# Patient Record
Sex: MALE | Race: BLACK OR AFRICAN AMERICAN | NOT HISPANIC OR LATINO | Employment: OTHER | URBAN - METROPOLITAN AREA
[De-identification: names, ages, dates, MRNs, and addresses within clinical notes are randomized per-mention and may not be internally consistent; named-entity substitution may affect disease eponyms.]

---

## 2017-10-18 ENCOUNTER — HOSPITAL ENCOUNTER (OUTPATIENT)
Facility: HOSPITAL | Age: 52
Discharge: HOME | End: 2017-10-19
Attending: EMERGENCY MEDICINE | Admitting: INTERNAL MEDICINE
Payer: MEDICARE

## 2017-10-18 DIAGNOSIS — E10.65 HYPERGLYCEMIA DUE TO TYPE 1 DIABETES MELLITUS: Primary | ICD-10-CM

## 2017-10-18 DIAGNOSIS — E86.0 DEHYDRATION: ICD-10-CM

## 2017-10-18 PROBLEM — N18.30 CHRONIC RENAL FAILURE, STAGE 3 (MODERATE): Status: ACTIVE | Noted: 2017-10-18

## 2017-10-18 PROBLEM — N18.9 CHRONIC KIDNEY FAILURE: Status: ACTIVE | Noted: 2017-10-18

## 2017-10-18 LAB
ALBUMIN SERPL BCP-MCNC: 3.5 G/DL
ALLENS TEST: ABNORMAL
ALP SERPL-CCNC: 98 U/L
ALT SERPL W/O P-5'-P-CCNC: 11 U/L
ANION GAP SERPL CALC-SCNC: 13 MMOL/L
AST SERPL-CCNC: 14 U/L
BACTERIA #/AREA URNS HPF: NORMAL /HPF
BASOPHILS # BLD AUTO: 0.01 K/UL
BASOPHILS NFR BLD: 0.1 %
BILIRUB SERPL-MCNC: 0.8 MG/DL
BILIRUB UR QL STRIP: NEGATIVE
BUN SERPL-MCNC: 30 MG/DL
CALCIUM SERPL-MCNC: 9.5 MG/DL
CHLORIDE SERPL-SCNC: 97 MMOL/L
CLARITY UR: CLEAR
CO2 SERPL-SCNC: 22 MMOL/L
COLOR UR: YELLOW
CREAT SERPL-MCNC: 1.9 MG/DL
DELSYS: ABNORMAL
DIFFERENTIAL METHOD: ABNORMAL
EOSINOPHIL # BLD AUTO: 0 K/UL
EOSINOPHIL NFR BLD: 0.4 %
ERYTHROCYTE [DISTWIDTH] IN BLOOD BY AUTOMATED COUNT: 12.4 %
EST. GFR  (AFRICAN AMERICAN): 46 ML/MIN/1.73 M^2
EST. GFR  (NON AFRICAN AMERICAN): 40 ML/MIN/1.73 M^2
FIO2: 21
GLUCOSE SERPL-MCNC: 653 MG/DL
GLUCOSE UR QL STRIP: ABNORMAL
HCO3 UR-SCNC: 21 MMOL/L (ref 24–28)
HCT VFR BLD AUTO: 39.5 %
HGB BLD-MCNC: 13.2 G/DL
HGB UR QL STRIP: NEGATIVE
KETONES UR QL STRIP: ABNORMAL
LEUKOCYTE ESTERASE UR QL STRIP: NEGATIVE
LYMPHOCYTES # BLD AUTO: 1.2 K/UL
LYMPHOCYTES NFR BLD: 13.8 %
MCH RBC QN AUTO: 27.1 PG
MCHC RBC AUTO-ENTMCNC: 33.4 G/DL
MCV RBC AUTO: 81 FL
MICROSCOPIC COMMENT: NORMAL
MODE: ABNORMAL
MONOCYTES # BLD AUTO: 0.4 K/UL
MONOCYTES NFR BLD: 4.4 %
NEUTROPHILS # BLD AUTO: 7.3 K/UL
NEUTROPHILS NFR BLD: 81.3 %
NITRITE UR QL STRIP: NEGATIVE
PCO2 BLDA: 38.8 MMHG (ref 35–45)
PH SMN: 7.34 [PH] (ref 7.35–7.45)
PH UR STRIP: 5 [PH] (ref 5–8)
PLATELET # BLD AUTO: 219 K/UL
PMV BLD AUTO: 10.8 FL
PO2 BLDA: 94 MMHG (ref 80–100)
POC BE: -5 MMOL/L
POC SATURATED O2: 97 % (ref 95–100)
POCT GLUCOSE: 265 MG/DL (ref 70–110)
POCT GLUCOSE: 411 MG/DL (ref 70–110)
POCT GLUCOSE: 471 MG/DL (ref 70–110)
POCT GLUCOSE: 477 MG/DL (ref 70–110)
POTASSIUM SERPL-SCNC: 4.6 MMOL/L
PROT SERPL-MCNC: 7.4 G/DL
PROT UR QL STRIP: NEGATIVE
RBC # BLD AUTO: 4.87 M/UL
RBC #/AREA URNS HPF: 2 /HPF (ref 0–4)
SAMPLE: ABNORMAL
SITE: ABNORMAL
SODIUM SERPL-SCNC: 132 MMOL/L
SP GR UR STRIP: 1.01 (ref 1–1.03)
URN SPEC COLLECT METH UR: ABNORMAL
UROBILINOGEN UR STRIP-ACNC: NEGATIVE EU/DL
WBC # BLD AUTO: 9.01 K/UL
WBC #/AREA URNS HPF: 1 /HPF (ref 0–5)
YEAST URNS QL MICRO: NORMAL

## 2017-10-18 PROCEDURE — 96361 HYDRATE IV INFUSION ADD-ON: CPT

## 2017-10-18 PROCEDURE — 36600 WITHDRAWAL OF ARTERIAL BLOOD: CPT

## 2017-10-18 PROCEDURE — 63600175 PHARM REV CODE 636 W HCPCS: Performed by: NURSE PRACTITIONER

## 2017-10-18 PROCEDURE — 96374 THER/PROPH/DIAG INJ IV PUSH: CPT

## 2017-10-18 PROCEDURE — 99900035 HC TECH TIME PER 15 MIN (STAT)

## 2017-10-18 PROCEDURE — G0378 HOSPITAL OBSERVATION PER HR: HCPCS

## 2017-10-18 PROCEDURE — 25000003 PHARM REV CODE 250: Performed by: NURSE PRACTITIONER

## 2017-10-18 PROCEDURE — 81000 URINALYSIS NONAUTO W/SCOPE: CPT

## 2017-10-18 PROCEDURE — 80053 COMPREHEN METABOLIC PANEL: CPT

## 2017-10-18 PROCEDURE — 99284 EMERGENCY DEPT VISIT MOD MDM: CPT | Mod: 25

## 2017-10-18 PROCEDURE — 82800 BLOOD PH: CPT

## 2017-10-18 PROCEDURE — 25000003 PHARM REV CODE 250: Performed by: EMERGENCY MEDICINE

## 2017-10-18 PROCEDURE — 36415 COLL VENOUS BLD VENIPUNCTURE: CPT

## 2017-10-18 PROCEDURE — 96372 THER/PROPH/DIAG INJ SC/IM: CPT

## 2017-10-18 PROCEDURE — 82962 GLUCOSE BLOOD TEST: CPT

## 2017-10-18 PROCEDURE — 63600175 PHARM REV CODE 636 W HCPCS: Performed by: EMERGENCY MEDICINE

## 2017-10-18 PROCEDURE — 85025 COMPLETE CBC W/AUTO DIFF WBC: CPT

## 2017-10-18 PROCEDURE — 83036 HEMOGLOBIN GLYCOSYLATED A1C: CPT

## 2017-10-18 RX ORDER — ATORVASTATIN CALCIUM 40 MG/1
80 TABLET, FILM COATED ORAL DAILY
Status: DISCONTINUED | OUTPATIENT
Start: 2017-10-19 | End: 2017-10-19 | Stop reason: HOSPADM

## 2017-10-18 RX ORDER — GLUCAGON 1 MG
1 KIT INJECTION
Status: DISCONTINUED | OUTPATIENT
Start: 2017-10-18 | End: 2017-10-19 | Stop reason: HOSPADM

## 2017-10-18 RX ORDER — CARVEDILOL 6.25 MG/1
6.25 TABLET ORAL 2 TIMES DAILY WITH MEALS
Status: DISCONTINUED | OUTPATIENT
Start: 2017-10-18 | End: 2017-10-19 | Stop reason: HOSPADM

## 2017-10-18 RX ORDER — ENOXAPARIN SODIUM 100 MG/ML
40 INJECTION SUBCUTANEOUS EVERY 24 HOURS
Status: DISCONTINUED | OUTPATIENT
Start: 2017-10-18 | End: 2017-10-19 | Stop reason: HOSPADM

## 2017-10-18 RX ORDER — INSULIN ASPART 100 [IU]/ML
1-10 INJECTION, SOLUTION INTRAVENOUS; SUBCUTANEOUS
Status: DISCONTINUED | OUTPATIENT
Start: 2017-10-18 | End: 2017-10-19 | Stop reason: HOSPADM

## 2017-10-18 RX ORDER — HYDRALAZINE HYDROCHLORIDE 50 MG/1
100 TABLET, FILM COATED ORAL 3 TIMES DAILY
Status: DISCONTINUED | OUTPATIENT
Start: 2017-10-18 | End: 2017-10-19 | Stop reason: HOSPADM

## 2017-10-18 RX ORDER — PANTOPRAZOLE SODIUM 40 MG/1
40 TABLET, DELAYED RELEASE ORAL DAILY
Status: DISCONTINUED | OUTPATIENT
Start: 2017-10-19 | End: 2017-10-19 | Stop reason: HOSPADM

## 2017-10-18 RX ORDER — SODIUM CHLORIDE 9 MG/ML
1000 INJECTION, SOLUTION INTRAVENOUS
Status: COMPLETED | OUTPATIENT
Start: 2017-10-18 | End: 2017-10-18

## 2017-10-18 RX ORDER — NAPROXEN SODIUM 220 MG/1
81 TABLET, FILM COATED ORAL DAILY
Status: DISCONTINUED | OUTPATIENT
Start: 2017-10-19 | End: 2017-10-19 | Stop reason: HOSPADM

## 2017-10-18 RX ORDER — HYDROCHLOROTHIAZIDE 25 MG/1
50 TABLET ORAL DAILY
Status: DISCONTINUED | OUTPATIENT
Start: 2017-10-19 | End: 2017-10-19 | Stop reason: HOSPADM

## 2017-10-18 RX ORDER — LISINOPRIL 20 MG/1
40 TABLET ORAL DAILY
Status: DISCONTINUED | OUTPATIENT
Start: 2017-10-19 | End: 2017-10-19 | Stop reason: HOSPADM

## 2017-10-18 RX ORDER — AMLODIPINE BESYLATE 10 MG/1
10 TABLET ORAL NIGHTLY
Status: DISCONTINUED | OUTPATIENT
Start: 2017-10-18 | End: 2017-10-19 | Stop reason: HOSPADM

## 2017-10-18 RX ORDER — IBUPROFEN 200 MG
24 TABLET ORAL
Status: DISCONTINUED | OUTPATIENT
Start: 2017-10-18 | End: 2017-10-19 | Stop reason: HOSPADM

## 2017-10-18 RX ORDER — ENOXAPARIN SODIUM 100 MG/ML
40 INJECTION SUBCUTANEOUS EVERY 24 HOURS
Status: DISCONTINUED | OUTPATIENT
Start: 2017-10-18 | End: 2017-10-18 | Stop reason: SDUPTHER

## 2017-10-18 RX ORDER — SODIUM CHLORIDE 9 MG/ML
INJECTION, SOLUTION INTRAVENOUS CONTINUOUS
Status: DISCONTINUED | OUTPATIENT
Start: 2017-10-18 | End: 2017-10-18

## 2017-10-18 RX ORDER — CLONIDINE HYDROCHLORIDE 0.3 MG/1
0.3 TABLET ORAL 2 TIMES DAILY
Status: DISCONTINUED | OUTPATIENT
Start: 2017-10-18 | End: 2017-10-19 | Stop reason: HOSPADM

## 2017-10-18 RX ORDER — IBUPROFEN 200 MG
16 TABLET ORAL
Status: DISCONTINUED | OUTPATIENT
Start: 2017-10-18 | End: 2017-10-19 | Stop reason: HOSPADM

## 2017-10-18 RX ORDER — DOXAZOSIN 2 MG/1
4 TABLET ORAL NIGHTLY
Status: DISCONTINUED | OUTPATIENT
Start: 2017-10-18 | End: 2017-10-19 | Stop reason: HOSPADM

## 2017-10-18 RX ORDER — DOXAZOSIN 4 MG/1
4 TABLET ORAL NIGHTLY
COMMUNITY

## 2017-10-18 RX ORDER — SPIRONOLACTONE 25 MG/1
50 TABLET ORAL DAILY
Status: DISCONTINUED | OUTPATIENT
Start: 2017-10-19 | End: 2017-10-19 | Stop reason: HOSPADM

## 2017-10-18 RX ADMIN — CARVEDILOL 6.25 MG: 6.25 TABLET, FILM COATED ORAL at 07:10

## 2017-10-18 RX ADMIN — DOXAZOSIN MESYLATE 4 MG: 2 TABLET ORAL at 09:10

## 2017-10-18 RX ADMIN — INSULIN HUMAN 10 UNITS: 100 INJECTION, SOLUTION PARENTERAL at 03:10

## 2017-10-18 RX ADMIN — CLONIDINE HYDROCHLORIDE 0.3 MG: 0.3 TABLET ORAL at 09:10

## 2017-10-18 RX ADMIN — SODIUM CHLORIDE 1000 ML: 0.9 INJECTION, SOLUTION INTRAVENOUS at 03:10

## 2017-10-18 RX ADMIN — HYDRALAZINE HYDROCHLORIDE 100 MG: 50 TABLET, FILM COATED ORAL at 09:10

## 2017-10-18 RX ADMIN — INSULIN ASPART 3 UNITS: 100 INJECTION, SOLUTION INTRAVENOUS; SUBCUTANEOUS at 08:10

## 2017-10-18 RX ADMIN — INSULIN HUMAN 10 UNITS: 100 INJECTION, SOLUTION PARENTERAL at 04:10

## 2017-10-18 RX ADMIN — INSULIN DETEMIR 15 UNITS: 100 INJECTION, SOLUTION SUBCUTANEOUS at 04:10

## 2017-10-18 RX ADMIN — AMLODIPINE BESYLATE 10 MG: 10 TABLET ORAL at 09:10

## 2017-10-18 NOTE — ED NOTES
Pt lying in bed in NAD,VSS,RR equal and unlabored. Bed is low, locked, and call light in reach. Side rails up x 2. Pt and family updated on POC, will continue to monitor.

## 2017-10-18 NOTE — ASSESSMENT & PLAN NOTE
-Serum glucose 653, Anion gap 13, ph 7.342, HCO3 21.0  -UA shows 2+Ketones, 3+ glucose   - 10 units of Regular insulin IV and SC, and Detemir 15 units given in the ED  -2L fluid bolus given  -Maintenance fluids   -Accuchecks q2  -Last A1C 5.9 on 7/2016  -Patient reports noncompliance with diet   -A1c pending   -1800 corine ADA diet

## 2017-10-18 NOTE — PROGRESS NOTES
"Patient arrived to unit from ER via stretcher.   Patient in room 226.  Transferred into bed with 1 assist.   Bedside report given with Chari CAMERON.  Charge nurse advised of patient arrival.   VS currently stable.   Tele monitored applied.   Patient oriented to room, rounding sheet and call bell.   Bed in lowest position, call light in reach.  Encouraged to notify of all needs.   Will continue to monitor.  BP (!) 154/108 (BP Location: Right arm, Patient Position: Sitting)   Pulse 102   Temp 97.6 °F (36.4 °C) (Oral)   Resp 20   Ht 5' 8" (1.727 m)   Wt 90.8 kg (200 lb 2.8 oz)   SpO2 100%   BMI 30.44 kg/m²     "

## 2017-10-18 NOTE — SUBJECTIVE & OBJECTIVE
Past Medical History:   Diagnosis Date    Diabetes mellitus     Hypertension     Stroke        History reviewed. No pertinent surgical history.    Review of patient's allergies indicates:  No Known Allergies    No current facility-administered medications on file prior to encounter.      Current Outpatient Prescriptions on File Prior to Encounter   Medication Sig    aspirin 81 MG Chew Take 1 tablet (81 mg total) by mouth once daily.    carvedilol (COREG) 6.25 MG tablet Take 6.25 mg by mouth 2 (two) times daily with meals.    cloNIDine (CATAPRES) 0.3 MG tablet Take 0.3 mg by mouth 2 (two) times daily.    lisinopril (PRINIVIL,ZESTRIL) 40 MG tablet Take 40 mg by mouth once daily.    amlodipine (NORVASC) 10 MG tablet Take 10 mg by mouth every evening.     atorvastatin (LIPITOR) 80 MG tablet Take 1 tablet (80 mg total) by mouth once daily.    citalopram (CELEXA) 10 MG tablet Take 10 mg by mouth once daily.    hydrALAZINE (APRESOLINE) 100 MG tablet Take 100 mg by mouth 3 (three) times daily.    hydrochlorothiazide (HYDRODIURIL) 12.5 MG Tab Take 12.5 mg by mouth once daily.    insulin glargine (LANTUS) 100 unit/mL injection Inject 8 Units into the skin every evening.    metformin (GLUCOPHAGE) 1000 MG tablet Take 1 tablet (1,000 mg total) by mouth 2 (two) times daily before meals.    pantoprazole (PROTONIX) 40 MG tablet Take 40 mg by mouth once daily.    polyethylene glycol (GLYCOLAX) 17 gram PwPk Take 17 g by mouth once daily.    sertraline (ZOLOFT) 50 MG tablet Take 50 mg by mouth once daily.    sitagliptin (JANUVIA) 100 MG Tab Take 100 mg by mouth once daily.     Family History     Problem Relation (Age of Onset)    Heart disease Mother        Social History Main Topics    Smoking status: Former Smoker    Smokeless tobacco: Never Used    Alcohol use No    Drug use: No    Sexual activity: Not on file     Review of Systems   Constitutional: Negative.  Negative for activity change, appetite change,  chills, fatigue and fever.   HENT: Negative.    Eyes: Positive for visual disturbance (blurred vision ).   Respiratory: Positive for cough. Negative for chest tightness and shortness of breath.    Cardiovascular: Negative.  Negative for chest pain, palpitations and leg swelling.   Gastrointestinal: Negative.  Negative for abdominal pain, blood in stool, diarrhea and vomiting.   Endocrine: Positive for polyuria.   Genitourinary: Positive for frequency. Negative for dysuria, hematuria and urgency.   Musculoskeletal: Negative.    Skin: Negative.    Allergic/Immunologic: Negative.    Neurological: Negative.  Negative for dizziness, syncope, weakness, light-headedness and headaches.   Hematological: Negative.    Psychiatric/Behavioral: Negative.      Objective:     Vital Signs (Most Recent):  Temp: 98.2 °F (36.8 °C) (10/18/17 1319)  Pulse: 85 (10/18/17 1626)  Resp: 20 (10/18/17 1626)  BP: (!) 165/96 (10/18/17 1626)  SpO2: 100 % (10/18/17 1626) Vital Signs (24h Range):  Temp:  [98.2 °F (36.8 °C)] 98.2 °F (36.8 °C)  Pulse:  [] 85  Resp:  [17-20] 20  SpO2:  [95 %-100 %] 100 %  BP: (126-179)/() 165/96     Weight: 90.8 kg (200 lb 2.8 oz)  Body mass index is 30.44 kg/m².    Physical Exam   Constitutional: He is oriented to person, place, and time. He appears well-developed and well-nourished.   HENT:   Head: Normocephalic and atraumatic.   Eyes: EOM are normal. Pupils are equal, round, and reactive to light.   Neck: Normal range of motion. Neck supple.   Cardiovascular: Normal rate, regular rhythm, normal heart sounds and intact distal pulses.    No murmur heard.  Pulmonary/Chest: Effort normal and breath sounds normal. No respiratory distress. He has no wheezes.   Abdominal: Soft. Bowel sounds are normal. There is no tenderness.   Musculoskeletal: Normal range of motion. He exhibits no edema.   Neurological: He is alert and oriented to person, place, and time. He has normal reflexes.   Chronic Left sided numbness  and weakness.     Skin: Skin is warm and dry.   Psychiatric: He has a normal mood and affect. His behavior is normal. Judgment and thought content normal.   Nursing note and vitals reviewed.       Significant Labs:   BMP:   Recent Labs  Lab 10/18/17  1441   *   *   K 4.6   CL 97   CO2 22*   BUN 30*   CREATININE 1.9*   CALCIUM 9.5     CBC:   Recent Labs  Lab 10/18/17  1441   WBC 9.01   HGB 13.2*   HCT 39.5*        CMP:   Recent Labs  Lab 10/18/17  1441   *   K 4.6   CL 97   CO2 22*   *   BUN 30*   CREATININE 1.9*   CALCIUM 9.5   PROT 7.4   ALBUMIN 3.5   BILITOT 0.8   ALKPHOS 98   AST 14   ALT 11   ANIONGAP 13   EGFRNONAA 40*     Urine Studies:   Recent Labs  Lab 10/18/17  1430   COLORU Yellow   APPEARANCEUA Clear   PHUR 5.0   SPECGRAV 1.010   PROTEINUA Negative   GLUCUA 3+*   KETONESU 2+*   BILIRUBINUA Negative   OCCULTUA Negative   NITRITE Negative   UROBILINOGEN Negative   LEUKOCYTESUR Negative   RBCUA 2   WBCUA 1   BACTERIA Rare     All pertinent labs within the past 24 hours have been reviewed.    Significant Imaging:   Imaging Results    None

## 2017-10-18 NOTE — ED PROVIDER NOTES
"SCRIBE #1 NOTE: I, Jason Cr, am scribing for, and in the presence of, Wade Mansfield Jr., MD. I have scribed the entire note.      History      Chief Complaint   Patient presents with    Urinary Incontinence     "since last tuesday"       Review of patient's allergies indicates:  No Known Allergies     HPI   HPI    10/18/2017, 2:04 PM   History obtained from the patient and wife      History of Present Illness: Celso Fallon is a 51 y.o. male patient who presents to the Emergency Department for urinary frequency which onset gradually one week ago. Sxs are constant and moderate in severity. There are no mitigating or exacerbating factors noted.  Pt denies any fever, N/V/D, dysuria, hematuria, ABD pain, SOB, incontinence, and all other sxs at this time. No further complaints or concerns at this time.       Arrival mode: Personal vehicle     PCP: Alba Valdez MD       Past Medical History:  Past Medical History:   Diagnosis Date    Diabetes mellitus     Hypertension     Stroke        Past Surgical History:  History reviewed. No pertinent surgical history.      Family History:  History reviewed. No pertinent family history.    Social History:  Social History     Social History Main Topics    Smoking status: Former Smoker    Smokeless tobacco: Never Used    Alcohol use No    Drug use: No    Sexual activity: unknown       ROS   Review of Systems   Constitutional: Negative for fever.   HENT: Negative for sore throat.    Respiratory: Negative for shortness of breath.    Cardiovascular: Negative for chest pain.   Gastrointestinal: Negative for abdominal pain, anal bleeding, constipation, diarrhea, nausea and vomiting.   Genitourinary: Positive for frequency. Negative for difficulty urinating, dysuria and testicular pain.   Musculoskeletal: Negative for back pain.   Skin: Negative for rash.   Neurological: Negative for weakness.   Hematological: Does not bruise/bleed easily.       Physical Exam  " "    Initial Vitals [10/18/17 1319]   BP Pulse Resp Temp SpO2   (!) 179/112 (!) 111 18 98.2 °F (36.8 °C) 96 %      MAP       134.33          Physical Exam  Nursing Notes and Vital Signs Reviewed.  Constitutional: Patient is in no acute distress. Well-developed and well-nourished.  Head: Atraumatic. Normocephalic.  Eyes: PERRL. EOM intact. Conjunctivae are not pale. No scleral icterus.  ENT: Mucous membranes are moist. Oropharynx is clear and symmetric.    Neck: Supple. Full ROM. No lymphadenopathy.  Cardiovascular: Regular rate. Regular rhythm. No murmurs, rubs, or gallops. Distal pulses are 2+ and symmetric.  Pulmonary/Chest: No respiratory distress. Clear to auscultation bilaterally. No wheezing, rales, or rhonchi.  Abdominal: Soft and non-distended.  There is no tenderness.  No rebound, guarding, or rigidity. Good bowel sounds.  Genitourinary: No CVA tenderness  Musculoskeletal: Moves all extremities. No obvious deformities. No edema. No calf tenderness.  Skin: Warm and dry.  Neurological:  Alert, awake, and appropriate.  Normal speech.  No acute focal neurological deficits are appreciated.  Psychiatric: Normal affect. Good eye contact. Appropriate in content.    ED Course    Procedures  ED Vital Signs:  Vitals:    10/18/17 1319 10/18/17 1411 10/18/17 1529   BP: (!) 179/112 126/87 (!) 142/96   Pulse: (!) 111 102 93   Resp: 18 18 17   Temp: 98.2 °F (36.8 °C)     TempSrc: Oral     SpO2: 96% 98% 95%   Weight: 90.8 kg (200 lb 2.8 oz)     Height: 5' 8" (1.727 m)         Abnormal Lab Results:  Labs Reviewed   CBC W/ AUTO DIFFERENTIAL - Abnormal; Notable for the following:        Result Value    Hemoglobin 13.2 (*)     Hematocrit 39.5 (*)     MCV 81 (*)     Gran% 81.3 (*)     Lymph% 13.8 (*)     All other components within normal limits   COMPREHENSIVE METABOLIC PANEL - Abnormal; Notable for the following:     Sodium 132 (*)     CO2 22 (*)     Glucose 653 (*)     BUN, Bld 30 (*)     Creatinine 1.9 (*)     eGFR if  " American 46 (*)     eGFR if non  40 (*)     All other components within normal limits    Narrative:        GLUCOSE critical result(s) called and verbal readback obtained   from ARTHUR LOTT RN, 10/18/2017 15:19   URINALYSIS - Abnormal; Notable for the following:     Glucose, UA 3+ (*)     Ketones, UA 2+ (*)     All other components within normal limits   POCT GLUCOSE - Abnormal; Notable for the following:     POCT Glucose 471 (*)     All other components within normal limits   URINALYSIS MICROSCOPIC        All Lab Results:  Results for orders placed or performed during the hospital encounter of 10/18/17   CBC auto differential   Result Value Ref Range    WBC 9.01 3.90 - 12.70 K/uL    RBC 4.87 4.60 - 6.20 M/uL    Hemoglobin 13.2 (L) 14.0 - 18.0 g/dL    Hematocrit 39.5 (L) 40.0 - 54.0 %    MCV 81 (L) 82 - 98 fL    MCH 27.1 27.0 - 31.0 pg    MCHC 33.4 32.0 - 36.0 g/dL    RDW 12.4 11.5 - 14.5 %    Platelets 219 150 - 350 K/uL    MPV 10.8 9.2 - 12.9 fL    Gran # 7.3 1.8 - 7.7 K/uL    Lymph # 1.2 1.0 - 4.8 K/uL    Mono # 0.4 0.3 - 1.0 K/uL    Eos # 0.0 0.0 - 0.5 K/uL    Baso # 0.01 0.00 - 0.20 K/uL    Gran% 81.3 (H) 38.0 - 73.0 %    Lymph% 13.8 (L) 18.0 - 48.0 %    Mono% 4.4 4.0 - 15.0 %    Eosinophil% 0.4 0.0 - 8.0 %    Basophil% 0.1 0.0 - 1.9 %    Differential Method Automated    Comprehensive metabolic panel   Result Value Ref Range    Sodium 132 (L) 136 - 145 mmol/L    Potassium 4.6 3.5 - 5.1 mmol/L    Chloride 97 95 - 110 mmol/L    CO2 22 (L) 23 - 29 mmol/L    Glucose 653 (HH) 70 - 110 mg/dL    BUN, Bld 30 (H) 6 - 20 mg/dL    Creatinine 1.9 (H) 0.5 - 1.4 mg/dL    Calcium 9.5 8.7 - 10.5 mg/dL    Total Protein 7.4 6.0 - 8.4 g/dL    Albumin 3.5 3.5 - 5.2 g/dL    Total Bilirubin 0.8 0.1 - 1.0 mg/dL    Alkaline Phosphatase 98 55 - 135 U/L    AST 14 10 - 40 U/L    ALT 11 10 - 44 U/L    Anion Gap 13 8 - 16 mmol/L    eGFR if African American 46 (A) >60 mL/min/1.73 m^2    eGFR if non  40 (A) >60  mL/min/1.73 m^2   Urinalysis   Result Value Ref Range    Specimen UA Urine, Clean Catch     Color, UA Yellow Yellow, Straw, Allison    Appearance, UA Clear Clear    pH, UA 5.0 5.0 - 8.0    Specific Gravity, UA 1.010 1.005 - 1.030    Protein, UA Negative Negative    Glucose, UA 3+ (A) Negative    Ketones, UA 2+ (A) Negative    Bilirubin (UA) Negative Negative    Occult Blood UA Negative Negative    Nitrite, UA Negative Negative    Urobilinogen, UA Negative <2.0 EU/dL    Leukocytes, UA Negative Negative   Urinalysis Microscopic   Result Value Ref Range    RBC, UA 2 0 - 4 /hpf    WBC, UA 1 0 - 5 /hpf    Bacteria, UA Rare None-Occ /hpf    Yeast, UA None None    Microscopic Comment SEE COMMENT    POCT glucose   Result Value Ref Range    POCT Glucose 471 (HH) 70 - 110 mg/dL               The Emergency Provider reviewed the vital signs and test results, which are outlined above.    ED Discussion     4:10 PM: Discussed case with Loreto Francois NP ([facility]). Loreto agrees with current care and management of pt and accepts admission.   Admitting Service: [facility]   Admitting Physician: Dr. Rios  Admit to: Observation    4:10 PM: Re-evaluated pt. I have discussed test results, shared treatment plan, and the need for admission with patient and family at bedside. Pt and family express understanding at this time and agree with all information. All questions answered. Pt and family have no further questions or concerns at this time. Pt is ready for admit.      ED Medication(s):  Medications   insulin regular injection 10 Units (0 Units Intravenous Hold 10/18/17 1530)   0.9%  NaCl infusion (not administered)   insulin detemir pen 15 Units (not administered)   0.9%  NaCl infusion (1,000 mLs Intravenous New Bag 10/18/17 1526)   0.9%  NaCl infusion (1,000 mLs Intravenous New Bag 10/18/17 1526)   insulin regular injection 10 Units (10 Units Subcutaneous Given 10/18/17 1556)       New Prescriptions    No medications  on file             Medical Decision Making              Scribe Attestation:   Scribe #1: I performed the above scribed service and the documentation accurately describes the services I performed. I attest to the accuracy of the note.    Attending:   Physician Attestation Statement for Scribe #1: I, Wade Mansfield Jr., MD, personally performed the services described in this documentation, as scribed by Jason Cr, in my presence, and it is both accurate and complete.          Clinical Impression       ICD-10-CM ICD-9-CM   1. Hyperglycemia due to type 1 diabetes mellitus E10.65 250.01   2. Dehydration E86.0 276.51                 Wade Mansfield Jr., MD  10/18/17 1620       Wade Mansfield Jr., MD  10/18/17 1620       Wade Mansfield Jr., MD  10/18/17 1621

## 2017-10-18 NOTE — H&P
"[facility] Hospital Medicine  History & Physical    Patient Name: Celso Fallon  MRN: [id number]  Admission Date: 10/18/2017  Attending Physician: Michael Rios MD   Primary Care Provider: Alba Valdez MD         Patient information was obtained from patient and ER records.     Subjective:     Principal Problem:Type 2 diabetes mellitus without complication    Chief Complaint:   Chief Complaint   Patient presents with    Urinary Incontinence     "since last tuesday"        HPI: Celso Fallon is a 51 y.o. 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 Associated symptoms include blurred vision. Patient reports he missed his appointment with PCP last week. Patient reports taking insulin in the past but has been off for about 1 year and now takes po antidiabetics.  Pt denies any fever, N/V/D, dysuria, hematuria, ABD pain, SOB, incontinence, and all other sxs at this time. ED evaluation reveals hyponatremia, BUN 30, Creatinine 1.9, hyperglycemia, UA +ketones and 3+ glucose. Patient was given Regular Insulin 10 units IV, 10 units SC, 15 units Detemir SC, and 2L NS bolus in the ED. Patient admitted to Observation Unit.     Past Medical History:   Diagnosis Date    Diabetes mellitus     Hypertension     Stroke        History reviewed. No pertinent surgical history.    Review of patient's allergies indicates:  No Known Allergies    No current facility-administered medications on file prior to encounter.      Current Outpatient Prescriptions on File Prior to Encounter   Medication Sig    aspirin 81 MG Chew Take 1 tablet (81 mg total) by mouth once daily.    carvedilol (COREG) 6.25 MG tablet Take 6.25 mg by mouth 2 (two) times daily with meals.    cloNIDine (CATAPRES) 0.3 MG tablet Take 0.3 mg by " mouth 2 (two) times daily.    lisinopril (PRINIVIL,ZESTRIL) 40 MG tablet Take 40 mg by mouth once daily.    amlodipine (NORVASC) 10 MG tablet Take 10 mg by mouth every evening.     atorvastatin (LIPITOR) 80 MG tablet Take 1 tablet (80 mg total) by mouth once daily.    citalopram (CELEXA) 10 MG tablet Take 10 mg by mouth once daily.    hydrALAZINE (APRESOLINE) 100 MG tablet Take 100 mg by mouth 3 (three) times daily.    hydrochlorothiazide (HYDRODIURIL) 12.5 MG Tab Take 12.5 mg by mouth once daily.    insulin glargine (LANTUS) 100 unit/mL injection Inject 8 Units into the skin every evening.    metformin (GLUCOPHAGE) 1000 MG tablet Take 1 tablet (1,000 mg total) by mouth 2 (two) times daily before meals.    pantoprazole (PROTONIX) 40 MG tablet Take 40 mg by mouth once daily.    polyethylene glycol (GLYCOLAX) 17 gram PwPk Take 17 g by mouth once daily.    sertraline (ZOLOFT) 50 MG tablet Take 50 mg by mouth once daily.    sitagliptin (JANUVIA) 100 MG Tab Take 100 mg by mouth once daily.     Family History     Problem Relation (Age of Onset)    Heart disease Mother        Social History Main Topics    Smoking status: Former Smoker    Smokeless tobacco: Never Used    Alcohol use No    Drug use: No    Sexual activity: Not on file     Review of Systems   Constitutional: Negative.  Negative for activity change, appetite change, chills, fatigue and fever.   HENT: Negative.    Eyes: Positive for visual disturbance (blurred vision ).   Respiratory: Positive for cough. Negative for chest tightness and shortness of breath.    Cardiovascular: Negative.  Negative for chest pain, palpitations and leg swelling.   Gastrointestinal: Negative.  Negative for abdominal pain, blood in stool, diarrhea and vomiting.   Endocrine: Positive for polyuria.   Genitourinary: Positive for frequency. Negative for dysuria, hematuria and urgency.   Musculoskeletal: Negative.    Skin: Negative.    Allergic/Immunologic: Negative.     Neurological: Negative.  Negative for dizziness, syncope, weakness, light-headedness and headaches.   Hematological: Negative.    Psychiatric/Behavioral: Negative.      Objective:     Vital Signs (Most Recent):  Temp: 98.2 °F (36.8 °C) (10/18/17 1319)  Pulse: 85 (10/18/17 1626)  Resp: 20 (10/18/17 1626)  BP: (!) 165/96 (10/18/17 1626)  SpO2: 100 % (10/18/17 1626) Vital Signs (24h Range):  Temp:  [98.2 °F (36.8 °C)] 98.2 °F (36.8 °C)  Pulse:  [] 85  Resp:  [17-20] 20  SpO2:  [95 %-100 %] 100 %  BP: (126-179)/() 165/96     Weight: 90.8 kg (200 lb 2.8 oz)  Body mass index is 30.44 kg/m².    Physical Exam   Constitutional: He is oriented to person, place, and time. He appears well-developed and well-nourished.   HENT:   Head: Normocephalic and atraumatic.   Eyes: EOM are normal. Pupils are equal, round, and reactive to light.   Neck: Normal range of motion. Neck supple.   Cardiovascular: Normal rate, regular rhythm, normal heart sounds and intact distal pulses.    No murmur heard.  Pulmonary/Chest: Effort normal and breath sounds normal. No respiratory distress. He has no wheezes.   Abdominal: Soft. Bowel sounds are normal. There is no tenderness.   Musculoskeletal: Normal range of motion. He exhibits no edema.   Neurological: He is alert and oriented to person, place, and time. He has normal reflexes.   Chronic Left sided numbness and weakness.     Skin: Skin is warm and dry.   Psychiatric: He has a normal mood and affect. His behavior is normal. Judgment and thought content normal.   Nursing note and vitals reviewed.       Significant Labs:   BMP:   Recent Labs  Lab 10/18/17  1441   *   *   K 4.6   CL 97   CO2 22*   BUN 30*   CREATININE 1.9*   CALCIUM 9.5     CBC:   Recent Labs  Lab 10/18/17  1441   WBC 9.01   HGB 13.2*   HCT 39.5*        CMP:   Recent Labs  Lab 10/18/17  1441   *   K 4.6   CL 97   CO2 22*   *   BUN 30*   CREATININE 1.9*   CALCIUM 9.5   PROT 7.4   ALBUMIN  3.5   BILITOT 0.8   ALKPHOS 98   AST 14   ALT 11   ANIONGAP 13   EGFRNONAA 40*     Urine Studies:   Recent Labs  Lab 10/18/17  1430   COLORU Yellow   APPEARANCEUA Clear   PHUR 5.0   SPECGRAV 1.010   PROTEINUA Negative   GLUCUA 3+*   KETONESU 2+*   BILIRUBINUA Negative   OCCULTUA Negative   NITRITE Negative   UROBILINOGEN Negative   LEUKOCYTESUR Negative   RBCUA 2   WBCUA 1   BACTERIA Rare     All pertinent labs within the past 24 hours have been reviewed.    Significant Imaging:   Imaging Results    None       Assessment/Plan:     * Type 2 diabetes mellitus without complication    -Serum glucose 653, Anion gap 13, ph 7.342, HCO3 21.0  -UA shows 2+Ketones, 3+ glucose   - 10 units of Regular insulin IV and SC, and Detemir 15 units given in the ED  -2L fluid bolus given  -Maintenance fluids   -Accuchecks q2  -Last A1C 5.9 on 7/2016  -Patient reports noncompliance with diet   -A1c pending   -1800 corine ADA diet           Chronic renal failure, stage 3 (moderate)    BUN 30, Creatinine 1.9 (baseline)   Monitor             Anemia    H/H stable   Monitor           Stroke    Old stroke   Residual Left sided numbness and weakness   Resume ASA and Statin           Essential hypertension    Resume home meds   Hydralazine PRN   Will continue to monitor              VTE Risk Mitigation         Ordered     enoxaparin injection 40 mg  Daily     Route:  Subcutaneous        10/18/17 6213             Ángela Canseco NP  Department of Hospital Medicine   [facility]

## 2017-10-18 NOTE — HPI
Celso Fallon is a 51 y.o. male patient with PMHx of CVA (several years ago with residual left sided numbness and weakness, walks with a rolling walker), HTN, and DM II who presents to the Emergency Department for urinary frequency which onset gradually one week ago. Associated symptoms include blurred vision. Patient reports he missed his appointment with PCP last week. Patient reports taking insulin in the past but has been off for about 1 year and now takes po antidiabetics.  Pt denies any fever, N/V/D, dysuria, hematuria, ABD pain, SOB, incontinence, and all other sxs at this time. ED evaluation reveals hyponatremia, BUN 30, Creatinine 1.9, hyperglycemia, UA +ketones and 3+ glucose. Patient was given Regular Insulin 10 units IV, 10 units SC, 15 units Detemir SC, and 2L NS bolus in the ED. Patient admitted to Observation Unit.

## 2017-10-19 VITALS
SYSTOLIC BLOOD PRESSURE: 103 MMHG | TEMPERATURE: 98 F | RESPIRATION RATE: 18 BRPM | BODY MASS INDEX: 30.34 KG/M2 | WEIGHT: 200.19 LBS | HEART RATE: 82 BPM | OXYGEN SATURATION: 95 % | DIASTOLIC BLOOD PRESSURE: 63 MMHG | HEIGHT: 68 IN

## 2017-10-19 LAB
ALBUMIN SERPL BCP-MCNC: 3.3 G/DL
ALP SERPL-CCNC: 87 U/L
ALT SERPL W/O P-5'-P-CCNC: 11 U/L
ANION GAP SERPL CALC-SCNC: 12 MMOL/L
AST SERPL-CCNC: 12 U/L
BASOPHILS # BLD AUTO: 0.02 K/UL
BASOPHILS NFR BLD: 0.2 %
BILIRUB SERPL-MCNC: 0.8 MG/DL
BUN SERPL-MCNC: 28 MG/DL
CALCIUM SERPL-MCNC: 9.5 MG/DL
CHLORIDE SERPL-SCNC: 102 MMOL/L
CO2 SERPL-SCNC: 20 MMOL/L
CREAT SERPL-MCNC: 1.7 MG/DL
DIFFERENTIAL METHOD: ABNORMAL
EOSINOPHIL # BLD AUTO: 0.1 K/UL
EOSINOPHIL NFR BLD: 0.9 %
ERYTHROCYTE [DISTWIDTH] IN BLOOD BY AUTOMATED COUNT: 12.5 %
EST. GFR  (AFRICAN AMERICAN): 53 ML/MIN/1.73 M^2
EST. GFR  (NON AFRICAN AMERICAN): 46 ML/MIN/1.73 M^2
ESTIMATED AVG GLUCOSE: 232 MG/DL
GLUCOSE SERPL-MCNC: 378 MG/DL
HBA1C MFR BLD HPLC: 9.7 %
HCT VFR BLD AUTO: 38.3 %
HGB BLD-MCNC: 12.7 G/DL
LYMPHOCYTES # BLD AUTO: 1.3 K/UL
LYMPHOCYTES NFR BLD: 13.4 %
MCH RBC QN AUTO: 27.1 PG
MCHC RBC AUTO-ENTMCNC: 33.2 G/DL
MCV RBC AUTO: 82 FL
MONOCYTES # BLD AUTO: 0.7 K/UL
MONOCYTES NFR BLD: 6.8 %
NEUTROPHILS # BLD AUTO: 7.7 K/UL
NEUTROPHILS NFR BLD: 78.7 %
PLATELET # BLD AUTO: 231 K/UL
PMV BLD AUTO: 11.5 FL
POCT GLUCOSE: 271 MG/DL (ref 70–110)
POCT GLUCOSE: 308 MG/DL (ref 70–110)
POCT GLUCOSE: 385 MG/DL (ref 70–110)
POCT GLUCOSE: >500 MG/DL (ref 70–110)
POTASSIUM SERPL-SCNC: 3.9 MMOL/L
PROT SERPL-MCNC: 6.9 G/DL
RBC # BLD AUTO: 4.69 M/UL
SODIUM SERPL-SCNC: 134 MMOL/L
WBC # BLD AUTO: 9.76 K/UL

## 2017-10-19 PROCEDURE — 25000003 PHARM REV CODE 250: Performed by: NURSE PRACTITIONER

## 2017-10-19 PROCEDURE — 85025 COMPLETE CBC W/AUTO DIFF WBC: CPT

## 2017-10-19 PROCEDURE — G0378 HOSPITAL OBSERVATION PER HR: HCPCS

## 2017-10-19 PROCEDURE — 63600175 PHARM REV CODE 636 W HCPCS: Performed by: NURSE PRACTITIONER

## 2017-10-19 PROCEDURE — 80053 COMPREHEN METABOLIC PANEL: CPT

## 2017-10-19 PROCEDURE — 36415 COLL VENOUS BLD VENIPUNCTURE: CPT

## 2017-10-19 PROCEDURE — 25000003 PHARM REV CODE 250: Performed by: INTERNAL MEDICINE

## 2017-10-19 RX ORDER — INSULIN GLARGINE 300 [IU]/ML
15 INJECTION, SOLUTION SUBCUTANEOUS NIGHTLY
Qty: 1.5 ML | Refills: 0 | Status: SHIPPED | OUTPATIENT
Start: 2017-10-19

## 2017-10-19 RX ADMIN — INSULIN ASPART 8 UNITS: 100 INJECTION, SOLUTION INTRAVENOUS; SUBCUTANEOUS at 05:10

## 2017-10-19 RX ADMIN — INSULIN ASPART 10 UNITS: 100 INJECTION, SOLUTION INTRAVENOUS; SUBCUTANEOUS at 11:10

## 2017-10-19 RX ADMIN — INSULIN ASPART 6 UNITS: 100 INJECTION, SOLUTION INTRAVENOUS; SUBCUTANEOUS at 01:10

## 2017-10-19 RX ADMIN — CLONIDINE HYDROCHLORIDE 0.3 MG: 0.3 TABLET ORAL at 08:10

## 2017-10-19 RX ADMIN — ASPIRIN 81 MG CHEWABLE TABLET 81 MG: 81 TABLET CHEWABLE at 08:10

## 2017-10-19 RX ADMIN — INSULIN DETEMIR 15 UNITS: 100 INJECTION, SOLUTION SUBCUTANEOUS at 08:10

## 2017-10-19 RX ADMIN — ATORVASTATIN CALCIUM 80 MG: 40 TABLET, FILM COATED ORAL at 08:10

## 2017-10-19 RX ADMIN — CARVEDILOL 6.25 MG: 6.25 TABLET, FILM COATED ORAL at 07:10

## 2017-10-19 RX ADMIN — HYDROCHLOROTHIAZIDE 50 MG: 25 TABLET ORAL at 08:10

## 2017-10-19 RX ADMIN — HYDRALAZINE HYDROCHLORIDE 100 MG: 50 TABLET, FILM COATED ORAL at 05:10

## 2017-10-19 RX ADMIN — PANTOPRAZOLE SODIUM 40 MG: 40 TABLET, DELAYED RELEASE ORAL at 08:10

## 2017-10-19 RX ADMIN — LISINOPRIL 40 MG: 20 TABLET ORAL at 08:10

## 2017-10-19 RX ADMIN — INSULIN DETEMIR 15 UNITS: 100 INJECTION, SOLUTION SUBCUTANEOUS at 10:10

## 2017-10-19 RX ADMIN — HYDRALAZINE HYDROCHLORIDE 100 MG: 50 TABLET, FILM COATED ORAL at 03:10

## 2017-10-19 RX ADMIN — SPIRONOLACTONE 50 MG: 25 TABLET ORAL at 08:10

## 2017-10-19 RX ADMIN — INSULIN ASPART 10 UNITS: 100 INJECTION, SOLUTION INTRAVENOUS; SUBCUTANEOUS at 10:10

## 2017-10-19 NOTE — PROGRESS NOTES
Went over discharge instructions with patient.   Stressed importance of making and keeping all follow ups.   Patient verbalized understanding and has no questions in regards to discharge.  IV removed, catheter intact.  Telemetry box removed.  Patient dressed and awaiting for ride.

## 2017-10-19 NOTE — PLAN OF CARE
CM met with patient regarding discharge planning.  The patient plans to return home where he lives with his wife.  The patient reports trouble with affording medications due to him not working from being disabled.  The patient reports he no longer drives and lets his wife transport him to all appointments.  No post hospital needs noted at this time.      10/19/17 1025   Discharge Assessment   Assessment Type Discharge Planning Assessment   Confirmed/corrected address and phone number on facesheet? Yes   Assessment information obtained from? Patient   Expected Length of Stay (days) 1   Communicated expected length of stay with patient/caregiver yes   Prior to hospitilization cognitive status: Alert/Oriented   Prior to hospitalization functional status: Assistive Equipment   Current cognitive status: Alert/Oriented   Current Functional Status: Assistive Equipment   Lives With spouse   Able to Return to Prior Arrangements yes   Is patient able to care for self after discharge? Yes   Patient's perception of discharge disposition home or selfcare   Readmission Within The Last 30 Days no previous admission in last 30 days   Patient currently being followed by outpatient case management? No   Patient currently receives any other outside agency services? No   Equipment Currently Used at Home cane, straight   Do you have any problems affording any of your prescribed medications? Yes   If yes, what medications? All medications   Is the patient taking medications as prescribed? yes   Does the patient have transportation home? Yes   Transportation Available family or friend will provide   Does the patient receive services at the [facility]? No   Discharge Plan A Home   Discharge Plan B Home   Patient/Family In Agreement With Plan yes

## 2017-10-19 NOTE — HOSPITAL COURSE
The patient initially presented a blood glucose of 653. The patients blood glucose improved somewhat overnight with insulin. The patient reports that he used to be on insulin but his doctor weaned him off and started him on metformin. He reports that the metformin gives him diarrhea so he stopped taking it. The diabetic educator was consulted and educated the patient on the importance of compliance. The HgbA1c was found to be 9.7 during this admission. The patient was seen and examined today and deemed stable for discharge. The plan of care and importance of medication compliance was discussed with the patient and his wife. The patient is being discharged home on [facility] and will follow up with his PCP.

## 2017-10-19 NOTE — PROGRESS NOTES
Pt blood sugar >500  Notified Nando Cabral NP  Sliding scale used, administer 10 units  Awaiting new orders

## 2017-10-19 NOTE — PLAN OF CARE
Problem: Patient Care Overview  Goal: Plan of Care Review  Outcome: Ongoing (interventions implemented as appropriate)  Patient currently resting quietly in bed. Patient awake, alert, oriented x4. VS currently stable. Patient NSR on monitor at this time. Fall prevention reviewed with patient. Patient educated and encouraged to use the call light for any needs. Patient call light within reach. Patient free of falls. Patient has no complaints of pain at this time. Plan of care reviewed with patient. Patient has no further questions about plan of care at this time. Will continue to monitor.

## 2017-10-19 NOTE — CONSULTS
Consult received   Chart was reviewed for diabetes surveillance  Met with patient and was concerned regarding the accuracy of his answers  Spoke to wife, Michelle, [phone], via telephone to obtain info  She reports he was diagnosed with pre-diabetes in 2008 and diabetes in 2110  He has received diabetes education in the past  He has a home glucose monitor and they are aware of meter use.  He was not testing for the past month as they felt he was doing well with his diabetes management  Recommended they now test at least 2 times daily and record results for PCP visits on provided log sheets  He was prescribed insulin in the past (insulin pen and they are aware of how to use the pen).  The insulin was discontinued and he was prescribed Januvia, which they were unable to afford  He was prescribed metformin, which caused diarrhea and he has not taken it consistently for the past 2-3 months  Wife reports he has been drinking juice and regular soft drinks, but he denies this  Discussed the importance of taking prescribed meds and eliminating high calorie liquids  Wife has very good knowledge on teach back, but reinforced to both info on target glucose values, hyper, hypoglycemia to include signs, symptoms, causes and treatment  Wife verbalizes understanding      Literature provided  Info shared with staff nurse

## 2017-10-19 NOTE — DISCHARGE SUMMARY
[facility] Hospital Medicine  Discharge Summary      Patient Name: Celso Fallon  MRN: [id number]  Admission Date: 10/18/2017  Hospital Length of Stay: 0 days  Discharge Date and Time:  10/19/2017 3:56 PM  Attending Physician: No att. providers found   Discharging Provider: Jaron Cabral NP  Primary Care Provider: Alba Valdez MD      HPI:   Celso Fallon is a 51 y.o. 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 Associated symptoms include blurred vision. Patient reports he missed his appointment with PCP last week. Patient reports taking insulin in the past but has been off for about 1 year and now takes po antidiabetics.  Pt denies any fever, N/V/D, dysuria, hematuria, ABD pain, SOB, incontinence, and all other sxs at this time. ED evaluation reveals hyponatremia, BUN 30, Creatinine 1.9, hyperglycemia, UA +ketones and 3+ glucose. Patient was given Regular Insulin 10 units IV, 10 units SC, 15 units Detemir SC, and 2L NS bolus in the ED. Patient admitted to Observation Unit.     * No surgery found *      Indwelling Lines/Drains at time of discharge:   Lines/Drains/Airways          No matching active lines, drains, or airways        Hospital Course:   The patient initially presented a blood glucose of 653. The patients blood glucose improved somewhat overnight with insulin. The patient reports that he used to be on insulin but his doctor weaned him off and started him on metformin. He reports that the metformin gives him diarrhea so he stopped taking it. The diabetic educator was consulted and educated the patient on the importance of compliance. The HgbA1c was found to be 9.7 during this admission. The patient was seen and examined today and deemed stable for discharge. The plan of care and  importance of medication compliance was discussed with the patient and his wife. The patient is being discharged home on [facility] and will follow up with his PCP.      Consults:   Consults         Status Ordering Provider     Inpatient consult to Diabetes educator  Once     Provider:  (Not yet assigned)    Completed MERY MOJICA          Significant Diagnostic Studies:   Imaging Results          X-Ray Chest 1 View (Final result)  Result time 10/18/17 18:00:31    Final result by Lilli Lala MD (10/18/17 18:00:31)                 Impression:         Negative portable chest x-ray.      Electronically signed by: LILLI LALA MD  Date:     10/18/17  Time:    18:00              Narrative:    Portable chest x-ray. 10/18/17 17:58:38    Clinical indication:  r/o infectious process.    Comparison is made to previous 07/16/2016.    Heart size is normal. The lung fields are clear. No acute cardiopulmonary infiltrate.                                Pending Diagnostic Studies:     None        Final Active Diagnoses:    Diagnosis Date Noted POA    PRINCIPAL PROBLEM:  Type 2 diabetes mellitus without complication [E11.9] 07/16/2016 Yes     Chronic    Chronic renal failure, stage 3 (moderate) [N18.3] 10/18/2017 Yes    Essential hypertension [I10] 07/16/2016 Yes     Chronic    Stroke [I63.9] 07/16/2016 Yes     Chronic    Anemia [D64.9] 07/16/2016 Yes      Problems Resolved During this Admission:    Diagnosis Date Noted Date Resolved POA      No new Assessment & Plan notes have been filed under this hospital service since the last note was generated.  Service: Hospital Medicine      Discharged Condition: stable    Disposition: Home or Self Care    Follow Up:  Follow-up Information     Alba Valdez MD. Schedule an appointment as soon as possible for a visit in 3 days.    Specialty:  Family Medicine  Contact information:  [address] [facility] [address]  [facility] [id number]  [phone]                 Patient  Instructions:   No discharge procedures on file.  Medications:  Reconciled Home Medications:   Discharge Medication List as of 10/19/2017  3:08 PM      START taking these medications    Details   insulin glargine, TOUJEO, (TOUJEO SOLOSTAR) 300 unit/mL (1.5 mL) InPn pen Inject 15 Units into the skin every evening., Starting Thu 10/19/2017, Normal         CONTINUE these medications which have NOT CHANGED    Details   aspirin 81 MG Chew Take 1 tablet (81 mg total) by mouth once daily., Starting Mon 7/18/2016, Until Wed 10/18/2017, OTC      carvedilol (COREG) 6.25 MG tablet Take 6.25 mg by mouth 2 (two) times daily with meals., Until Discontinued, Historical Med      cloNIDine (CATAPRES) 0.3 MG tablet Take 0.3 mg by mouth 2 (two) times daily., Until Discontinued, Historical Med      doxazosin (CARDURA) 4 MG tablet Take 4 mg by mouth every evening., Historical Med      lisinopril (PRINIVIL,ZESTRIL) 40 MG tablet Take 40 mg by mouth once daily., Until Discontinued, Historical Med      spironolactone-hydrochlorothiazide (ALDACTAZIDE) 50-50 mg per tablet Take 1 tablet by mouth once daily., Historical Med      amlodipine (NORVASC) 10 MG tablet Take 10 mg by mouth every evening. , Until Discontinued, Historical Med      atorvastatin (LIPITOR) 80 MG tablet Take 1 tablet (80 mg total) by mouth once daily., Starting 7/18/2016, Until Tue 7/18/17, Normal      citalopram (CELEXA) 10 MG tablet Take 10 mg by mouth once daily., Until Discontinued, Historical Med      hydrALAZINE (APRESOLINE) 100 MG tablet Take 100 mg by mouth 3 (three) times daily., Until Discontinued, Historical Med      hydrochlorothiazide (HYDRODIURIL) 12.5 MG Tab Take 12.5 mg by mouth once daily., Until Discontinued, Historical Med      pantoprazole (PROTONIX) 40 MG tablet Take 40 mg by mouth once daily., Until Discontinued, Historical Med      polyethylene glycol (GLYCOLAX) 17 gram PwPk Take 17 g by mouth once daily., Starting 7/18/2016, Until Discontinued, Normal       sitagliptin (JANUVIA) 100 MG Tab Take 100 mg by mouth once daily., Until Discontinued, Historical Med         STOP taking these medications       insulin glargine (LANTUS) 100 unit/mL injection Comments:   Reason for Stopping:         metformin (GLUCOPHAGE) 1000 MG tablet Comments:   Reason for Stopping:         sertraline (ZOLOFT) 50 MG tablet Comments:   Reason for Stopping:             Time spent on the discharge of patient: > 30 minutes      Jaron Cabral NP  Department of Hospital Medicine  [facility]

## 2018-12-31 ENCOUNTER — HOSPITAL ENCOUNTER (EMERGENCY)
Facility: HOSPITAL | Age: 53
Discharge: HOME | End: 2018-12-31
Attending: FAMILY MEDICINE
Payer: COMMERCIAL

## 2018-12-31 VITALS
RESPIRATION RATE: 18 BRPM | BODY MASS INDEX: 32.82 KG/M2 | HEIGHT: 70 IN | OXYGEN SATURATION: 100 % | WEIGHT: 229.25 LBS | HEART RATE: 97 BPM | DIASTOLIC BLOOD PRESSURE: 82 MMHG | SYSTOLIC BLOOD PRESSURE: 130 MMHG | TEMPERATURE: 98 F

## 2018-12-31 DIAGNOSIS — R73.9 HYPERGLYCEMIA: Primary | ICD-10-CM

## 2018-12-31 LAB
ALBUMIN SERPL BCP-MCNC: 3.9 G/DL
ALP SERPL-CCNC: 102 U/L
ALT SERPL W/O P-5'-P-CCNC: 17 U/L
ANION GAP SERPL CALC-SCNC: 11 MMOL/L
AST SERPL-CCNC: 17 U/L
B-OH-BUTYR BLD STRIP-SCNC: 0 MMOL/L
BACTERIA #/AREA URNS HPF: NORMAL /HPF
BASOPHILS # BLD AUTO: 0.02 K/UL
BASOPHILS NFR BLD: 0.3 %
BILIRUB SERPL-MCNC: 0.4 MG/DL
BILIRUB UR QL STRIP: NEGATIVE
BUN SERPL-MCNC: 29 MG/DL
CALCIUM SERPL-MCNC: 9.7 MG/DL
CHLORIDE SERPL-SCNC: 99 MMOL/L
CLARITY UR: CLEAR
CO2 SERPL-SCNC: 24 MMOL/L
COLOR UR: YELLOW
CREAT SERPL-MCNC: 2.2 MG/DL
DIFFERENTIAL METHOD: ABNORMAL
EOSINOPHIL # BLD AUTO: 0.1 K/UL
EOSINOPHIL NFR BLD: 1.8 %
ERYTHROCYTE [DISTWIDTH] IN BLOOD BY AUTOMATED COUNT: 13 %
EST. GFR  (AFRICAN AMERICAN): 38 ML/MIN/1.73 M^2
EST. GFR  (NON AFRICAN AMERICAN): 33 ML/MIN/1.73 M^2
GLUCOSE SERPL-MCNC: 583 MG/DL
GLUCOSE UR QL STRIP: ABNORMAL
HCT VFR BLD AUTO: 39 %
HGB BLD-MCNC: 12.8 G/DL
HGB UR QL STRIP: NEGATIVE
HYALINE CASTS #/AREA URNS LPF: 1 /LPF
KETONES UR QL STRIP: NEGATIVE
LEUKOCYTE ESTERASE UR QL STRIP: NEGATIVE
LYMPHOCYTES # BLD AUTO: 1.9 K/UL
LYMPHOCYTES NFR BLD: 23.8 %
MCH RBC QN AUTO: 27.4 PG
MCHC RBC AUTO-ENTMCNC: 32.8 G/DL
MCV RBC AUTO: 83 FL
MICROSCOPIC COMMENT: NORMAL
MONOCYTES # BLD AUTO: 0.6 K/UL
MONOCYTES NFR BLD: 7.3 %
NEUTROPHILS # BLD AUTO: 5.3 K/UL
NEUTROPHILS NFR BLD: 66.8 %
NITRITE UR QL STRIP: NEGATIVE
PH UR STRIP: 6 [PH] (ref 5–8)
PLATELET # BLD AUTO: 302 K/UL
PMV BLD AUTO: 10.5 FL
POCT GLUCOSE: 229 MG/DL (ref 70–110)
POTASSIUM SERPL-SCNC: 4.1 MMOL/L
PROT SERPL-MCNC: 7.8 G/DL
PROT UR QL STRIP: NEGATIVE
RBC # BLD AUTO: 4.68 M/UL
SODIUM SERPL-SCNC: 134 MMOL/L
SP GR UR STRIP: 1.01 (ref 1–1.03)
URN SPEC COLLECT METH UR: ABNORMAL
UROBILINOGEN UR STRIP-ACNC: NEGATIVE EU/DL
WBC # BLD AUTO: 7.98 K/UL
YEAST URNS QL MICRO: NORMAL

## 2018-12-31 PROCEDURE — 36415 COLL VENOUS BLD VENIPUNCTURE: CPT

## 2018-12-31 PROCEDURE — 25000003 PHARM REV CODE 250: Performed by: FAMILY MEDICINE

## 2018-12-31 PROCEDURE — 82010 KETONE BODYS QUAN: CPT

## 2018-12-31 PROCEDURE — 99284 EMERGENCY DEPT VISIT MOD MDM: CPT | Mod: 25

## 2018-12-31 PROCEDURE — 96374 THER/PROPH/DIAG INJ IV PUSH: CPT

## 2018-12-31 PROCEDURE — 81000 URINALYSIS NONAUTO W/SCOPE: CPT

## 2018-12-31 PROCEDURE — 85025 COMPLETE CBC W/AUTO DIFF WBC: CPT

## 2018-12-31 PROCEDURE — 82962 GLUCOSE BLOOD TEST: CPT | Mod: 91

## 2018-12-31 PROCEDURE — 80053 COMPREHEN METABOLIC PANEL: CPT

## 2018-12-31 PROCEDURE — 63600175 PHARM REV CODE 636 W HCPCS: Performed by: FAMILY MEDICINE

## 2018-12-31 PROCEDURE — 96361 HYDRATE IV INFUSION ADD-ON: CPT

## 2018-12-31 RX ORDER — SERTRALINE HYDROCHLORIDE 100 MG/1
100 TABLET, FILM COATED ORAL DAILY
COMMUNITY

## 2018-12-31 RX ADMIN — INSULIN HUMAN 10 UNITS: 100 INJECTION, SOLUTION PARENTERAL at 07:12

## 2018-12-31 RX ADMIN — SODIUM CHLORIDE 1000 ML: 0.9 INJECTION, SOLUTION INTRAVENOUS at 07:12

## 2019-01-01 LAB — POCT GLUCOSE: 475 MG/DL (ref 70–110)

## 2019-01-01 NOTE — ED PROVIDER NOTES
SCRIBE #1 NOTE: I, Swati Forte, am scribing for, and in the presence of, Romina Uriostegui MD. I have scribed the entire note.     SCRIBE #2 NOTE: I, Janes Maguire, am scribing for, and in the presence of,  Charles Sinclair MD. I have scribed the remaining portions of the note not scribed by Scribe #1.     History      Chief Complaint   Patient presents with    Hyperglycemia     CBG at home was 570       Review of patient's allergies indicates:  No Known Allergies     HPI   HPI    12/31/2018, 7:22 PM   History obtained from the patient      History of Present Illness: Celso Fallon is a 53 y.o. male patient with a PMHx of DM, HTN, stroke, who presents to the Emergency Department for an evaluation of hyperglycemia ( at home today). Pt is noncompliant with medications. Pt is c/o fatigue which onset a couple days ago. Symptoms are constant and moderate in severity.  No mitigating or exacerbating factors reported. Associated sxs include polyuria. Pt reports an episode of CP yesterday and lethargy 2 days ago. Pt is concerned of a TIA. Pt denies CP at this time. Patient denies any SOB, diaphoresis, palpitations, extremity weakness, leg pain/swelling, dizziness, cough, n/v, polydipsia, and all other sxs at this time. No further complaints or concerns at this time.         Arrival mode: Personal vehicle      PCP: RT Anel       Past Medical History:  Past Medical History:   Diagnosis Date    Diabetes mellitus     Hypertension     Stroke        Past Surgical History:  History reviewed. No pertinent surgical history.      Family History:  Family History   Problem Relation Age of Onset    Heart disease Mother        Social History:  Social History     Tobacco Use    Smoking status: Former Smoker    Smokeless tobacco: Never Used   Substance and Sexual Activity    Alcohol use: No     Comment: occassionally    Drug use: No    Sexual activity: Unknown       ROS   Review of Systems    Constitutional: Negative for activity change, chills, diaphoresis and fever.        +lethargy   HENT: Negative for congestion, drooling, ear pain, rhinorrhea, sneezing, sore throat and trouble swallowing.    Eyes: Negative for pain.   Respiratory: Negative for cough, chest tightness, shortness of breath, wheezing and stridor.    Cardiovascular: Positive for chest pain (resolved). Negative for palpitations and leg swelling.   Gastrointestinal: Negative for abdominal distention, abdominal pain, constipation, diarrhea, nausea and vomiting.   Endocrine: Positive for polyuria. Negative for polydipsia.        +elevated blood sugar   Genitourinary: Negative for difficulty urinating, dysuria, frequency and urgency.   Musculoskeletal: Negative for arthralgias, back pain, myalgias, neck pain and neck stiffness.   Skin: Negative for pallor, rash and wound.   Neurological: Negative for dizziness, syncope, weakness, light-headedness, numbness and headaches.   Hematological: Does not bruise/bleed easily.   All other systems reviewed and are negative.      Physical Exam      Initial Vitals   BP Pulse Resp Temp SpO2   12/31/18 1842 12/31/18 1842 12/31/18 1842 12/31/18 1842 12/31/18 2025   (!) 150/92 86 17 97.6 °F (36.4 °C) 98 %      MAP       --                 Physical Exam  Nursing Notes and Vital Signs Reviewed.  Constitutional: Patient is in no acute distress.   Head: Atraumatic. Normocephalic.  Eyes: PERRL. EOM intact. Conjunctivae are not pale. No scleral icterus.  ENT: Mucous membranes are moist. Oropharynx is clear and symmetric.    Neck: Supple. Full ROM. No lymphadenopathy.  Cardiovascular: Regular rate. Regular rhythm. No murmurs, rubs, or gallops. Distal pulses are 2+ and symmetric.  Pulmonary/Chest: No respiratory distress. Clear to auscultation bilaterally. No wheezing or rales.  Abdominal: Soft and non-distended.  There is no tenderness.  No rebound, guarding, or rigidity.   Musculoskeletal: Moves all extremities.  "No obvious deformities. No edema.   Skin: Warm and dry.  Neurological: Patient is alert and oriented to person, place and time. Pupils ERRL and EOM normal. Cranial nerves II-XII are intact. LUE and LLE is hyperreflexic. Strength is full bilaterally; it is equal and 5/5 in bilateral upper extremities. Strength is 5/5 in RLE. Strength is 4/5 in LLE. There is no pronator drift of outstretched arms. Light touch sense is intact. Speech is clear and normal. No acute focal neurological deficits noted.  Psychiatric: Normal affect. Good eye contact. Appropriate in content.    ED Course    Procedures  ED Vital Signs:  Vitals:    12/31/18 1842 12/31/18 1930 12/31/18 2025 12/31/18 2103   BP: (!) 150/92  131/88 130/82   Pulse: 86 82 92 97   Resp: 17 18 18   Temp: 97.6 °F (36.4 °C)   98 °F (36.7 °C)   TempSrc: Oral   Oral   SpO2:   98% 100%   Weight: 104 kg (229 lb 4.5 oz)      Height: 5' 10" (1.778 m)          Abnormal Lab Results:  Labs Reviewed   CBC W/ AUTO DIFFERENTIAL - Abnormal; Notable for the following components:       Result Value    Hemoglobin 12.8 (*)     Hematocrit 39.0 (*)     All other components within normal limits   COMPREHENSIVE METABOLIC PANEL - Abnormal; Notable for the following components:    Sodium 134 (*)     Glucose 583 (*)     BUN, Bld 29 (*)     Creatinine 2.2 (*)     eGFR if  38 (*)     eGFR if non  33 (*)     All other components within normal limits    Narrative:      GLU  critical result(s) called and verbal readback obtained from   Christi Pham RN, 12/31/2018 19:55   URINALYSIS - Abnormal; Notable for the following components:    Glucose, UA 3+ (*)     All other components within normal limits   POCT GLUCOSE - Abnormal; Notable for the following components:    POCT Glucose 229 (*)     All other components within normal limits   BETA - HYDROXYBUTYRATE, SERUM   URINALYSIS MICROSCOPIC        All Lab Results:  Results for orders placed or performed during the " hospital encounter of 12/31/18   CBC auto differential   Result Value Ref Range    WBC 7.98 3.90 - 12.70 K/uL    RBC 4.68 4.60 - 6.20 M/uL    Hemoglobin 12.8 (L) 14.0 - 18.0 g/dL    Hematocrit 39.0 (L) 40.0 - 54.0 %    MCV 83 82 - 98 fL    MCH 27.4 27.0 - 31.0 pg    MCHC 32.8 32.0 - 36.0 g/dL    RDW 13.0 11.5 - 14.5 %    Platelets 302 150 - 350 K/uL    MPV 10.5 9.2 - 12.9 fL    Gran # (ANC) 5.3 1.8 - 7.7 K/uL    Lymph # 1.9 1.0 - 4.8 K/uL    Mono # 0.6 0.3 - 1.0 K/uL    Eos # 0.1 0.0 - 0.5 K/uL    Baso # 0.02 0.00 - 0.20 K/uL    Gran% 66.8 38.0 - 73.0 %    Lymph% 23.8 18.0 - 48.0 %    Mono% 7.3 4.0 - 15.0 %    Eosinophil% 1.8 0.0 - 8.0 %    Basophil% 0.3 0.0 - 1.9 %    Differential Method Automated    Comprehensive metabolic panel   Result Value Ref Range    Sodium 134 (L) 136 - 145 mmol/L    Potassium 4.1 3.5 - 5.1 mmol/L    Chloride 99 95 - 110 mmol/L    CO2 24 23 - 29 mmol/L    Glucose 583 (HH) 70 - 110 mg/dL    BUN, Bld 29 (H) 6 - 20 mg/dL    Creatinine 2.2 (H) 0.5 - 1.4 mg/dL    Calcium 9.7 8.7 - 10.5 mg/dL    Total Protein 7.8 6.0 - 8.4 g/dL    Albumin 3.9 3.5 - 5.2 g/dL    Total Bilirubin 0.4 0.1 - 1.0 mg/dL    Alkaline Phosphatase 102 55 - 135 U/L    AST 17 10 - 40 U/L    ALT 17 10 - 44 U/L    Anion Gap 11 8 - 16 mmol/L    eGFR if African American 38 (A) >60 mL/min/1.73 m^2    eGFR if non African American 33 (A) >60 mL/min/1.73 m^2   Urinalysis - Clean Catch   Result Value Ref Range    Specimen UA Urine, Clean Catch     Color, UA Yellow Yellow, Straw, Allison    Appearance, UA Clear Clear    pH, UA 6.0 5.0 - 8.0    Specific Gravity, UA 1.015 1.005 - 1.030    Protein, UA Negative Negative    Glucose, UA 3+ (A) Negative    Ketones, UA Negative Negative    Bilirubin (UA) Negative Negative    Occult Blood UA Negative Negative    Nitrite, UA Negative Negative    Urobilinogen, UA Negative <2.0 EU/dL    Leukocytes, UA Negative Negative   Beta - Hydroxybutyrate, Serum   Result Value Ref Range    Beta-Hydroxybutyrate  0.0 0.0 - 0.5 mmol/L   Urinalysis Microscopic   Result Value Ref Range    Bacteria, UA None None-Occ /hpf    Yeast, UA None None    Hyaline Casts, UA 1 0-1/lpf /lpf    Microscopic Comment SEE COMMENT    POCT glucose   Result Value Ref Range    POCT Glucose 229 (H) 70 - 110 mg/dL         Imaging Results:  Imaging Results          CT Head Without Contrast (Final result)  Result time 12/31/18 20:17:17    Final result by Cale Lima MD (12/31/18 20:17:17)                 Impression:      No acute findings.  Extensive chronic changes of cerebral atrophy, small vessel disease, and old infarction are demonstrated.  These findings are stable.    All CT scans at (this location) are performed using dose modulation techniques as appropriate to a performed exam including the following:  automated exposure control; adjustment of the mA and /or kV according to patient size (this includes techniques or standardized protocols for targeted exams where dose is matched to indication/reason for exam: i.e. extremities or head); use of iterative reconstruction technique.      Electronically signed by: Selvin Lima MD  Date:    12/31/2018  Time:    20:17             Narrative:    EXAMINATION:  CT HEAD WITHOUT CONTRAST    CLINICAL HISTORY:  Confusion/delirium, altered LOC, unexplained;    TECHNIQUE:  Standard noncontrast CT of the brain.    All CT scans at this facility use dose modulation, iterative reconstruction and/or weight based dosing when appropriate to reduce radiation dose to as low as reasonably achievable.    COMPARISON:  Comparisons made with brain MRI of 07/17/2016 and CT scan of the head dated 07/16/2016    FINDINGS:  There is a chronic pattern of cerebral atrophy with dilatation of lateral ventricles deep new cortical sulcal markings.  Extensive patchy decreased attenuation of the periventricular white matter suggests chronic changes of small vessel disease.  Prominent areas of lacunar infarction are seen in the  basal ganglia, particularly the left thalamus.  Areas of old infarction in the right cerebellar hemisphere are unchanged compared to previous exam.  No new acute hemorrhage or infarct is demonstrated.    The skull is grossly normal.                                        The Emergency Provider reviewed the vital signs and test results, which are outlined above.    ED Discussion     7:54 PM: Dr. Uriostegui transfers care of pt to Dr. Sniclair, pending lab/imaging results.    9:27 PM: Reassessed pt at this time.  Pt states his condition has improved at this time.  Discussed with pt all pertinent ED information and results. Discussed pt dx and plan of tx. Pt admits occasional non-compliance with his insulin. I advised pt to take insulin as directed daily. Gave pt all f/u and return to the ED instructions. All questions and concerns were addressed at this time. Pt expresses understanding of information and instructions, and is comfortable with plan to discharge. Pt is stable for discharge.    I discussed with patient and/or family/caretaker that evaluation in the ED does not suggest any emergent or life threatening medical conditions requiring immediate intervention beyond what was provided in the ED, and I believe patient is safe for discharge.  Regardless, an unremarkable evaluation in the ED does not preclude the development or presence of a serious of life threatening condition. As such, patient was instructed to return immediately for any worsening or change in current symptoms.      ED Medication(s):  Medications   sodium chloride 0.9% bolus 1,000 mL (0 mLs Intravenous Stopped 12/31/18 2259)   insulin regular injection 10 Units (10 Units Intravenous Given 12/31/18 1938)       Follow-up Information     RT. Anel Schedule an appointment as soon as possible for a visit in 3 days.    Specialty:  Radiology  Why:  to follow-up on today's visit           Go to  [facility] - .    Specialty:  Emergency  Medicine  Why:  As needed, If symptoms worsen  Contact information:  [address] [facility] [address] [phone]                   Medical Decision Making    Medical Decision Making:   Clinical Tests:   Lab Tests: Ordered and Reviewed  Radiological Study: Ordered and Reviewed           Scribe Attestation:   Scribe #1: I performed the above scribed service and the documentation accurately describes the services I performed. I attest to the accuracy of the note.    Attending:   Physician Attestation Statement for Scribe #1: I, Romina Uriostegui MD, personally performed the services described in this documentation, as scribed by Swati Forte, in my presence, and it is both accurate and complete.       Scribe Attestation:   Scribe #2: I performed the above scribed service and the documentation accurately describes the services I performed. I attest to the accuracy of the note.    Attending Attestation:           Physician Attestation for Scribe:    Physician Attestation Statement for Scribe #2: I, Charles Sinclair MD, reviewed documentation, as scribed by Janes Maguire in my presence, and it is both accurate and complete. I also acknowledge and confirm the content of the note done by Barbaraibchioma #1.          Clinical Impression       ICD-10-CM ICD-9-CM   1. Hyperglycemia R73.9 790.29       Disposition:   Disposition: Discharged  Condition: Stable         Charles Sinclair MD  01/01/19 0353

## 2019-01-01 NOTE — ED NOTES
"Pt c/o high blood sugar at home. Pt states "I have been feeling fatigued that started a couple days ago."  Pt noncompliant with some medications. Pt states "I am also concerned about a TIA"  Pt has L sided weakness from previous stroke and c/o polyuria.   "

## 2023-01-30 ENCOUNTER — OFFICE VISIT (OUTPATIENT)
Dept: URBAN - METROPOLITAN AREA CLINIC 30 | Facility: CLINIC | Age: 58
End: 2023-01-30
Payer: COMMERCIAL

## 2023-01-30 DIAGNOSIS — H43.813 VITREOUS DEGENERATION, BILATERAL: Primary | ICD-10-CM

## 2023-01-30 DIAGNOSIS — E11.9 TYPE 2 DIABETES MELLITUS WITHOUT COMPLICATIONS: ICD-10-CM

## 2023-01-30 DIAGNOSIS — H40.013 OPEN ANGLE WITH BORDERLINE FINDINGS, LOW RISK, BILATERAL: ICD-10-CM

## 2023-01-30 PROCEDURE — 92134 CPTRZ OPH DX IMG PST SGM RTA: CPT

## 2023-01-30 PROCEDURE — 76514 ECHO EXAM OF EYE THICKNESS: CPT

## 2023-01-30 PROCEDURE — 92004 COMPRE OPH EXAM NEW PT 1/>: CPT

## 2023-01-30 ASSESSMENT — INTRAOCULAR PRESSURE
OD: 22
OS: 23

## 2023-01-30 ASSESSMENT — VISUAL ACUITY
OS: 20/20
OD: 20/20

## 2023-01-30 NOTE — IMPRESSION/PLAN
Impression: Open angle with borderline findings, low risk, bilateral: H40.013. Plan: - Overview: GS due to mild elevated IOP 
- Current meds: none - Laser Hx: no - Surgical Hx: no
- H/o steroid/trauma/low blood pressure: no
- Tmax: 22/23
- Fam Hx: no
- Race: no
- Pachs: 502/476
- Gonio: n/a - Allergies: no
- Patient denies sulfa allergy (CI w/ CAIs), denies heart/lung problems Today, IOP 22/23 on no gtts OCT RNFL: significant inferior temporal/temporal thinning OD, temporal thinning OS Pachs: [id number] PLAN: Monitor w/o tx.  Fu with HVF

## 2023-01-30 NOTE — IMPRESSION/PLAN
Impression: Type 2 diabetes mellitus without complications: S04.2. Plan: No diabetic retinopathy is noted. Patient is advised that a yearly ophthalmic exam is recommended. Return sooner if any new symptoms.